# Patient Record
Sex: FEMALE | Race: WHITE
[De-identification: names, ages, dates, MRNs, and addresses within clinical notes are randomized per-mention and may not be internally consistent; named-entity substitution may affect disease eponyms.]

---
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
## 2021-06-21 NOTE — PROGRESS NOTES
[facility] GENETIC COUNSELING: CONSULT SUMMARY  Patient: Vernell Schaffer (2000 / 18 y.o., female) MRN: [id number]   [address] [facility] [id number]      Date/Location of Visit: 10/30/2018, [facility]  Care Provider: Vashti Whittington MS, [facility] ([phone], [email or web address])  Referring Provider: Dasia Lewis MD ([facility])  Present: Vernell     PRESENTING CONCERN: family history of pancreatic cancer and leukemia    MEDICAL: Relevant medical history for Vernell is summarized below.      Cancer screening: Not yet had any cancer screening (no mammogram, colonoscopy, Pap test) but does have annual physical exams with Dr. Lewis.    Hormone/reproductive: First menstrual period at age 13 and currently premenopausal with no pregnancy history.  No hormonal birth control now or in the past.    Exposures: No history of smoking.      Prior genetic testing: None.    No prior transplant or recent transfusion.    FAMILY: Three-generation pedigree was taken to assess cancer risk using information provided by Vernell and is scanned into her chart.        Significant for: Mother was diagnosed with and  of pancreatic cancer at age 48 () and she was not a smoker.  Maternal grandmother was diagnosed with breast cancer at age 69 and her sister (Vernell's great-aunt) had colon polyps at age 45 followed by ovarian and liver cancers a few years later and their mother (Vernell's great-grandmother) had breast cancer at age 55.  Vernell's younger sister was diagnosed with T-cell ALL at age 7 and was in remission but then had recurrence and  just this past September ().        Genetic testing: Vernell provided records which confirm that her maternal grandmother had genetic testing through [facility] laboratory in  and was found to have a pathogenic variant in the BRCA2 gene referred to as [id number].  The remainder of her testing was negative and included analysis of 12 additional  genes on [facility]Next panel (APC, CORY, BRCA1, CDKN2A, EPCAM, MLH1, MSH2, MSH6, PALB2, PMS2, STK11, TP53).  Vernell believes that her mother also had genetic testing but is not sure and does not have those records today.  No other relatives have been tested that Vernell is aware.    The importance of accurate and verified history was emphasized.  In evaluating Vernell's family history we discussed relevant genetic principals and inheritance patterns.     ASSESSMENT:  The pathogenic variant in BRCA2 would be consistent with Vernell's maternal family history of breast, ovarian, and pancreatic cancers.  There is expected to be up to a 50% chance Vernell also inherited this pathogenic variant based on autosomal dominant inheritance and assuming from clinical history that her mother likely also had this variant.  I did encourage Vernell to verify her mother's testing and results if at all possible.  Testing for this pathogenic BRCA2 variant is appropriate for Vernell in accordance with current NCCN guidelines.    It is less clear whether Vernell's sister's ALL would have been related to this pathogenic BRCA2 variant.  It is perhaps most likely an unrelated event but there has been some (unconfirmed) suggestion that heterozygous BRCA carriers have higher rates of pediatric hematologic malignancies and of course, this can occur with Fanconi anemia (I.e., biallelic pathogenic BRCA2 variants).  This sister had one hospitalization after about 3 years of chemotherapy when she developed a septic bacterial infection but no other treatment toxicity.  No anomalies or learning differences.  No other blood count abnormalities that Vernell knows of and no transplant.  The paternal family history is negative for any cancers but also small and limited in female relatives. Unfortunately, absence of these features cannot exclude the possibility of Fanconi anemia in the sister either.  This would be relevant to Vernell because it would occur  only with a paternally inherited BRCA2 variant separate from the known pathogenic variant on her maternal side.  In some of these cases the second variant may be hypomorphic (i.e., FA-causing in combination with a pathogenic BRCA2 variant but not conferring the same cancer risk in the heterozygous state).  With all of this in mind, I would offer the option of full BRCA2 gene sequencing for Vernell and/or having her father tested with a request for all variants including polymorphisms to be reported.    USE OF RESULTS:  We reviewed likelihood of possible results of genetic testing along with medical and social/psychological considerations.     Reviewed risks for breast, ovarian, pancreatic cancers and melanoma associated with pathogenic variants in BRCA2.  Risks for these cancers should be extremely small at Vernell's current age even if she does have the pathogenic BRCA2 variant and there would be no immediate impact to her medical care.  We discussed measures Vernell could take now to minimize cancer risk including sun protection, self skin and breast exams, annual physicals and avoidance of smoking and tanning beds.  We also discussed possible impact to life, long-term care, or disability insurance with genetic testing and I encouraged Vernell to discuss this more with her father.    Surveillance and preventative measures would be implemented with age if positive for the pathogenic BRCA2 variant including:      Earlier and/or additional cancer surveillance (i.e., annual breast MRIs by age 25 with annual mammograms and MRIs by age 30, annual skin exams, possibly EUS/MRI for pancreatic cancer closer to age 38)    Risk-reducing surgeries could be considered (i.e., bilateral mastectomy, surgery to remove ovaries/tubes) and we discussed recommended timing    Reproductive planning (i.e., impact of oophorectomy as well as risk of Fanconi anemia in offspring and options for assisted reproductive technologies if Vernell and her  partner would both be carriers of a causative BRCA2 variant; however, Vernell indicates she currently does not feel she will want to have children)    In the event of a negative result Vernell would most likely be considered closer to average risk for BRCA-related cancers and there would be no apparent need for heightened surveillance or preventative surgeries.  Of course this will not exclude Vernell from sharing any other, unknown risk factors (genetic and not) in her family.   At a minimum general population cancer screening recommendations would still apply.     FAMILY:  Reviewed autosomal dominant inheritance, implications for any future children based on Vernell's result, and importance of informing all at-risk relatives.  This includes Vernell's older sister who is no longer in contact with the family.    PSYCHOSOCIAL/DECISION-MAKING:  Vernell came today with only limited knowledge of the genetic testing that had been done in her family.  She, her siblings, and cousins were all fairly young when this information was obtained and so she vaguely remembers it being discussed among the adults.  Her uncle provided a copy of her grandmother's report in preparation for her visit which she brought with her today.     We discussed at length the psychological implications of genetic testing considering Vernell's age (i.e., when results would not immediately impact her care) and the recent diagnoses and deaths in her immediate family members (i.e., her mother and sister).  Vernell tells me that she did try seeing a therapist after her mother  but this was very brief and she did not feel comfortable in that setting.  Rather, Vernell has relied on her family and friends for support and feels she has a strong support system at this time and multiple people who she discussed her appointment with that were very encouraging and helpful to her.      Vernell describes herself as wanting to be in control of her health but also  somewhat more inclined to think cancer is inevitable for her.  However, she also states that she is not a worrier about this as she maybe has been about other things in the past or would have been at one time in her life.  She was aware that she would be able to make her own appointments and her own medical decisions once she turned 18 and was anxious to be able to do this right away.  I directly addressed the possibility of increased anxiety for Vernell with results and whether she would equate this to dying of cancer like her relatives; however, she denies such concerns.  She foresees benefits to knowing her results including being able to adjust to results and plan her care for the future.  She did indicate a willingness to pursue surveillance and preventative surgeries if needed and as appropriate with her age.    Vernell is a [employer] and connects strongly with her [employer].  She and her family are no longer in contact with her older sister once that sister turned 18 and Vernell indicated that this is because of their [employer].  I do think it is important to ensure this sister is informed of the pathogenic BRCA2 variant and will discuss this more with Vernell.    OTHER: Collection or assessment of other aspects of the family history not related to cancer risk were beyond the scope of our discussion today.    PLAN: Vernell provided written and verbal informed consent for genetic testing today.  She will take some time to think about the information we discussed, talk to her father about insurance implications, and see if he has a copy of any genetic testing for Vernell's mother.  She agreed to contact me when she is ready and I can place orders for her testing.  We can discuss more at that time the option of full sequencing or having her father tested for the outside chance of her sister's ALL being related to Fanconi anemia.  She would then need to return to [facility] for a blood draw and will need to  provide a copy of her insurance cards for the laboratory at that time.  I did recommend a return visit for results disclosure and encouraged her to bring a support person if possible.    Vernell should contact our office at least annually for updates or with changes to personal/family history as the clinic does not routinely re-contact individual patients with an increase or change in knowledge.      Handouts or Enclosures:  contact information, After Visit Summary, CAROL and You    Face-to-face time: 70 minutes    cc:   Verenll Lewis MD ([facility])

## 2021-06-21 NOTE — PROGRESS NOTES
Vernell sent me an email message with a couple of updates to her family history.  She tells me that she found out her mother did not have any genetic testing done for the pathogenic BRCA2 variant or otherwise.  Her mother had diabetes but it is unclear if this was before or after the diagnosis of pancreatic cancer.  Vernell also found out that her great-grandmother (her paternal grandfather's mother) had breast cancer from which she  around age 52-55.  Vernell is still considering whether she would want to have genetic testing done in the near future or wait a bit longer but she will let us know as she thinks about it more.